# Patient Record
Sex: FEMALE | Race: WHITE | NOT HISPANIC OR LATINO | Employment: FULL TIME | URBAN - METROPOLITAN AREA
[De-identification: names, ages, dates, MRNs, and addresses within clinical notes are randomized per-mention and may not be internally consistent; named-entity substitution may affect disease eponyms.]

---

## 2025-02-10 ENCOUNTER — OFFICE VISIT (OUTPATIENT)
Dept: OBSTETRICS AND GYNECOLOGY | Facility: CLINIC | Age: 20
End: 2025-02-10
Payer: COMMERCIAL

## 2025-02-10 VITALS
DIASTOLIC BLOOD PRESSURE: 84 MMHG | WEIGHT: 230.6 LBS | HEIGHT: 66 IN | BODY MASS INDEX: 37.06 KG/M2 | SYSTOLIC BLOOD PRESSURE: 136 MMHG

## 2025-02-10 DIAGNOSIS — Z30.431 IUD CHECK UP: ICD-10-CM

## 2025-02-10 DIAGNOSIS — Z12.4 CERVICAL CANCER SCREENING: ICD-10-CM

## 2025-02-10 DIAGNOSIS — Z11.3 SCREEN FOR STD (SEXUALLY TRANSMITTED DISEASE): ICD-10-CM

## 2025-02-10 DIAGNOSIS — Z01.419 WELL WOMAN EXAM: Primary | ICD-10-CM

## 2025-02-10 PROCEDURE — 99395 PREV VISIT EST AGE 18-39: CPT | Performed by: OBSTETRICS & GYNECOLOGY

## 2025-02-10 PROCEDURE — 3008F BODY MASS INDEX DOCD: CPT | Performed by: OBSTETRICS & GYNECOLOGY

## 2025-02-10 RX ORDER — COPPER 313.4 MG/1
1 INTRAUTERINE DEVICE INTRAUTERINE ONCE
COMMUNITY

## 2025-02-10 ASSESSMENT — PATIENT HEALTH QUESTIONNAIRE - PHQ9
1. LITTLE INTEREST OR PLEASURE IN DOING THINGS: NOT AT ALL
2. FEELING DOWN, DEPRESSED OR HOPELESS: NOT AT ALL
SUM OF ALL RESPONSES TO PHQ9 QUESTIONS 1 & 2: 0

## 2025-02-10 ASSESSMENT — LIFESTYLE VARIABLES
HOW OFTEN DO YOU HAVE SIX OR MORE DRINKS ON ONE OCCASION: NEVER
AUDIT-C TOTAL SCORE: 1
SKIP TO QUESTIONS 9-10: 1
HOW OFTEN DO YOU HAVE A DRINK CONTAINING ALCOHOL: MONTHLY OR LESS
HOW MANY STANDARD DRINKS CONTAINING ALCOHOL DO YOU HAVE ON A TYPICAL DAY: 1 OR 2

## 2025-02-10 ASSESSMENT — ENCOUNTER SYMPTOMS
DEPRESSION: 0
LOSS OF SENSATION IN FEET: 0
OCCASIONAL FEELINGS OF UNSTEADINESS: 0

## 2025-02-10 ASSESSMENT — PAIN SCALES - GENERAL: PAINLEVEL_OUTOF10: 0-NO PAIN

## 2025-02-10 NOTE — PROGRESS NOTES
Patient Name:  Francy Guevara  :  2005  MR #:  [id number]  Acct #:  [id number]      Assessment:  1. Well woman exam (Primary)      2. Cervical cancer screening    - THINPREP PAP TEST    3. Screen for STD (sexually transmitted disease)    - C. trachomatis / N. gonorrhoeae, Amplified, Urogenital; Future  - THINPREP PAP TEST  - Trichomonas vaginalis, Amplified    4. IUD check up    -PAP every 3 years if normal. Next pap due Now  -STI screening G/C/T obtained.  -Reviewed Gardasil history or recommendation  -Birth control counseling:  Barrier methods of contraception encouraged for BC and for prevention of STIs.   -Preventative hygiene habits.  -Health promoting habits: Exercise ie. aerobics, yoga/pilates, weight bearing exercises 3 to 5 days/week. Diet ie. Meals with balanced portions of healthy fats, complex carbohydrates, and protein;  Take in adequate calcium and vitamin D3; drink plenty of water each day.  -Medication education:  Education:  All new and/or current medications discussed and reviewed including side effects with patient/caregiver, Understanding:  Caregiver/Patient expressed understanding., Adherence:  Barriers to adherence identified and discussed if present.        Chief Complaint:  Annual exam    Subjective:  HPI:  20 yo CF for annual exam.          Birth control: Copper T IUD        Cycles: regular, lasts 1 week and heavy.      GYNH:   Yes, first onset 10  LMP:  Patient's last menstrual period was 2025 (exact date).  HPV Vaccine Yes - .  Sexual activity Yes - . Coitarche Yes - .  Orientation: Straight  Birth control history: IUD - copper     Past Medical History:   Diagnosis Date    HSV-2 (herpes simplex virus 2) infection         Past Surgical History:   Procedure Laterality Date    SKIN SURGERY      Hand , foot mouth surgery    TONSILLECTOMY      WISDOM TOOTH EXTRACTION          OB History          0    Para   0    Term   0       0    AB   0    Living   0         SAB   0     IAB   0    Ectopic   0    Multiple   0    Live Births   0                  Prior to Admission medications    Medication Sig Start Date End Date Taking? Authorizing Provider   copper (ParaGard T 380A) 380 square mm IUD 1 each by intrauterine route 1 time. Age 15 insert/ approx 4 years ago   Yes Historical Provider, MD       No Known Allergies    ROS:   [facility] - WOMEN ONLY:          Breast Lump No acute changes.  Hot Flashes Occasional.  Painful Orr no.  Vaginal Discharge no.        [facility] - ROS Update:          Unexplained Weight Change no.  Pain anywhere in your body no.  Black or bloody stools no.  Problems with urination no.  Rashes or sores no.  Sexual problems no.  Depression or anxiety problems no.  Do you feel threatened by anyone No.        Objective:  Physical Examination:      GENERAL:          General Appearance:  well-developed, well-nourished, no functional handicap, well-groomed.          Hygiene:  good.          Ill-appearance:  none.          Mental Status:  alert and oriented.          Mood/Affect:  pleasant, appropriate.          Speech:  clear.          Eye contact:  normal.          Appears stated age:  yes.          Labs reviewed during visit  yes.          Diagnostic imaging reviewed with patient  yes.      LUNGS:          Effort:  no respiratory distress.      HEART:         HRRR with S1/S2 w/o M/C/R     BREASTS:          General:  no masses, no tenderness, no skin changes, no nipple abnormality, and no axillary lymphadenopathy.      ABDOMEN:          Tenderness:  none.          Distention:  none.      GENITOURINARY - FEMALE:          Bladder:  normal .          Pelvic support defects:  not seen .          External genitalia:  Normal.          Urethra:  Normal.          Vagina:  no lesions, moderate discharge, STI screens collected Yes - .          Cervix/ cuff:  normal appearance . Strings are visible         Uterus:  normal size/shape/consistency, non tender.          Adnexa:  normal , non  tender.      DERMATOLOGY:          Skin:  normal.      EXTREMITIES:          Normal:  no anomalies.          Edema:  none.      NEUROLOGICAL:          Orientation:  alert and oriented x 3.      PSYCHOLOGY:          Affect:  appropriate.          Mood:  pleasant.

## 2025-02-11 LAB — T VAGINALIS RRNA SPEC QL NAA+PROBE: NOT DETECTED

## 2025-02-24 LAB
CYTOLOGY CMNT CVX/VAG CYTO-IMP: NORMAL
LAB AP CONTRACEPTIVE HISTORY: NORMAL
LAB AP HPV GENOTYPE QUESTION: YES
LAB AP HPV HR: NORMAL
LABORATORY COMMENT REPORT: NORMAL
LMP START DATE: NORMAL
PATH REPORT.TOTAL CANCER: NORMAL
RESIDENT REVIEW: NORMAL

## 2025-03-04 ENCOUNTER — PATIENT MESSAGE (OUTPATIENT)
Dept: OBSTETRICS AND GYNECOLOGY | Facility: CLINIC | Age: 20
End: 2025-03-04
Payer: COMMERCIAL

## 2025-03-12 ENCOUNTER — OFFICE VISIT (OUTPATIENT)
Dept: OBSTETRICS AND GYNECOLOGY | Facility: CLINIC | Age: 20
End: 2025-03-12
Payer: COMMERCIAL

## 2025-03-12 VITALS
SYSTOLIC BLOOD PRESSURE: 118 MMHG | BODY MASS INDEX: 37.2 KG/M2 | WEIGHT: 237 LBS | HEIGHT: 67 IN | DIASTOLIC BLOOD PRESSURE: 80 MMHG

## 2025-03-12 DIAGNOSIS — N89.8 VAGINAL ODOR: ICD-10-CM

## 2025-03-12 DIAGNOSIS — N89.8 LEUKORRHEA: ICD-10-CM

## 2025-03-12 DIAGNOSIS — Z30.431 IUD CHECK UP: Primary | ICD-10-CM

## 2025-03-12 DIAGNOSIS — A42.9 ACTINOMYCOSIS: ICD-10-CM

## 2025-03-12 PROCEDURE — 99212 OFFICE O/P EST SF 10 MIN: CPT | Performed by: OBSTETRICS & GYNECOLOGY

## 2025-03-12 PROCEDURE — 3008F BODY MASS INDEX DOCD: CPT | Performed by: OBSTETRICS & GYNECOLOGY

## 2025-03-12 ASSESSMENT — PATIENT HEALTH QUESTIONNAIRE - PHQ9
SUM OF ALL RESPONSES TO PHQ9 QUESTIONS 1 & 2: 0
2. FEELING DOWN, DEPRESSED OR HOPELESS: NOT AT ALL
1. LITTLE INTEREST OR PLEASURE IN DOING THINGS: NOT AT ALL

## 2025-03-12 ASSESSMENT — ENCOUNTER SYMPTOMS
LOSS OF SENSATION IN FEET: 0
OCCASIONAL FEELINGS OF UNSTEADINESS: 0
DEPRESSION: 0

## 2025-03-12 ASSESSMENT — PAIN SCALES - GENERAL: PAINLEVEL_OUTOF10: 0-NO PAIN

## 2025-03-12 ASSESSMENT — LIFESTYLE VARIABLES
HOW OFTEN DO YOU HAVE SIX OR MORE DRINKS ON ONE OCCASION: NEVER
HOW MANY STANDARD DRINKS CONTAINING ALCOHOL DO YOU HAVE ON A TYPICAL DAY: 3 OR 4
SKIP TO QUESTIONS 9-10: 0
AUDIT-C TOTAL SCORE: 3
HOW OFTEN DO YOU HAVE A DRINK CONTAINING ALCOHOL: 2-4 TIMES A MONTH

## 2025-03-12 NOTE — PROGRESS NOTES
"Subjective   Francy Guevara is a 19 y.o. female who presents for vaginal discharge.  Pap mentions a bacteria that could be significant for a patient with an intrauterine device.  Would like to screen her for growth of this bacteria on a culture.  Treatment may be necessary based on results.  Please schedule a quick visit for a genital screen at her convenience.   Duration:>1 month  Color:- whitish  Odor:   yes - comes and goes  Irritation:   itching/burning  New partner:   no  Sexual history reviewed with the patient.   STI Exposure:   denies.  Contraception:   IUD      OB History          0    Para   0    Term   0       0    AB   0    Living   0         SAB   0    IAB   0    Ectopic   0    Multiple   0    Live Births   0                  Patient's last menstrual period was 2025 (exact date).       Objective   /80   Ht 1.702 m (5' 7\")   Wt 108 kg (237 lb)   LMP 2025 (Exact Date)   BMI 37.12 kg/m²             General: Alert and oriented, in NAD   Abdomen: normal   Vulva: normal   Vagina: Discharge preset   Cervix: Normal; strings visible.  NO CMT. Swabs collected   Uterus: normal   Adnexa: normal       Cultures: done       Diagnoses and all orders for this visit:  IUD check up  -     Vaginitis Gram Stain For Bacterial Vaginosis + Yeast  -     Actinomyces (Cervical swab); Other-indicate in comment ([facility]); eSwab - Miscellaneous Test  Leukorrhea  -     Vaginitis Gram Stain For Bacterial Vaginosis + Yeast  -     Actinomyces (Cervical swab); Other-indicate in comment ([facility]); eSwab - Miscellaneous Test  Vaginal odor  -     Vaginitis Gram Stain For Bacterial Vaginosis + Yeast  -     Actinomyces (Cervical swab); Other-indicate in comment ([facility]); eSwab - Miscellaneous Test  Actinomycosis  -     Actinomyces (Cervical swab); Other-indicate in comment ([facility]); eSwab - Miscellaneous Test    -Screening Actinomyces due to pap result.  NO symptoms.    -Screen for BV due to complaints. "     Medication education:   All new and/or current medications discussed and reviewed including side effects with patient/caregiver, Understanding:  Caregiver/Patient expressed understanding., Adherence:  Barriers to adherence identified and discussed if present.    This note was done using Dragon voice-speech recognition software. It may contain errors with citlali, punctuation or spelling in transcription.

## 2025-03-13 LAB — BV SCORE VAG QL: NORMAL

## 2025-03-15 LAB — BACTERIA GENITAL AEROBE CULT: ABNORMAL

## 2025-03-16 LAB — BACTERIA GENITAL AEROBE CULT: ABNORMAL

## 2025-03-19 NOTE — RESULT ENCOUNTER NOTE
The swab for Actinomyces was selected from the [facility] genital swab KEY.  Their answer is not acceptable.     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 She otherwise has normal shamar. Still investigating the Actinomyces swab.  Would she like prophylactic treatment?